# Patient Record
Sex: FEMALE | Race: NATIVE HAWAIIAN OR OTHER PACIFIC ISLANDER | Employment: FULL TIME | URBAN - METROPOLITAN AREA
[De-identification: names, ages, dates, MRNs, and addresses within clinical notes are randomized per-mention and may not be internally consistent; named-entity substitution may affect disease eponyms.]

---

## 2021-06-06 ENCOUNTER — HOSPITAL ENCOUNTER (EMERGENCY)
Age: 21
Discharge: HOME | End: 2021-06-06
Attending: EMERGENCY MEDICINE
Payer: COMMERCIAL

## 2021-06-06 ENCOUNTER — APPOINTMENT (OUTPATIENT)
Dept: GENERAL RADIOLOGY | Age: 21
End: 2021-06-06
Attending: EMERGENCY MEDICINE
Payer: COMMERCIAL

## 2021-06-06 VITALS
OXYGEN SATURATION: 99 % | WEIGHT: 160 LBS | HEART RATE: 82 BPM | RESPIRATION RATE: 16 BRPM | SYSTOLIC BLOOD PRESSURE: 101 MMHG | TEMPERATURE: 98 F | HEIGHT: 62 IN | DIASTOLIC BLOOD PRESSURE: 57 MMHG | BODY MASS INDEX: 29.44 KG/M2

## 2021-06-06 DIAGNOSIS — M24.411 RECURRENT ANTERIOR DISLOCATION OF RIGHT SHOULDER: Primary | ICD-10-CM

## 2021-06-06 PROCEDURE — 74011250636 HC RX REV CODE- 250/636: Performed by: EMERGENCY MEDICINE

## 2021-06-06 PROCEDURE — 73030 X-RAY EXAM OF SHOULDER: CPT

## 2021-06-06 PROCEDURE — 75810000301 HC ER LEVEL 1 CLOSED TREATMNT FRACTURE/DISLOCATION

## 2021-06-06 PROCEDURE — 99284 EMERGENCY DEPT VISIT MOD MDM: CPT

## 2021-06-06 RX ORDER — PROPOFOL 10 MG/ML
INJECTION, EMULSION INTRAVENOUS
Status: DISCONTINUED
Start: 2021-06-06 | End: 2021-06-06 | Stop reason: HOSPADM

## 2021-06-06 RX ORDER — PROPOFOL 10 MG/ML
200 INJECTION, EMULSION INTRAVENOUS
Status: COMPLETED | OUTPATIENT
Start: 2021-06-06 | End: 2021-06-06

## 2021-06-06 RX ADMIN — PROPOFOL 200 MG: 10 INJECTION, EMULSION INTRAVENOUS at 13:05

## 2021-06-06 RX ADMIN — SODIUM CHLORIDE 1000 ML: 900 INJECTION, SOLUTION INTRAVENOUS at 12:51

## 2021-06-06 NOTE — ED TRIAGE NOTES
Pt ambulatory to triage. Pt states she has a hx of right shoulder dislocation x 3 and today was throwing something and felt her shoulder pop and is unable to move right arm independently.

## 2021-06-06 NOTE — ED PROVIDER NOTES
[address] [facility] Beka Jaimes is a 23 y.o. female seen on 6/6/2021 in the [facility] EMERGENCY DEPT in room ER05/05. Chief Complaint   Patient presents with    Shoulder Pain     HPI: 22-year-old right-hand-dominant female presented to the emergency department with right shoulder pain and probable dislocation that occurred just prior to arrival.  Patient states that she was throwing something and felt her shoulder pop out of place. This is the third or fourth time she has had her dislocation. She does not have an established orthopedic doctor here in town. Patient has no other injuries. Patient cannot move her arm secondary to pain. She has no numbness or tingling. She has no other complaints at this time. Historian: Patient    REVIEW OF SYSTEMS     Review of Systems   Constitutional: Negative. Respiratory: Negative. Cardiovascular: Negative. Gastrointestinal: Negative. Genitourinary: Negative. Musculoskeletal: Positive for arthralgias. Skin: Negative. Neurological: Negative. Psychiatric/Behavioral: Negative. All other systems reviewed and are negative. PAST MEDICAL HISTORY     No past medical history on file. No past surgical history on file. Social History     Socioeconomic History    Marital status: SINGLE     Spouse name: Not on file    Number of children: Not on file    Years of education: Not on file    Highest education level: Not on file     Social Determinants of Health     Financial Resource Strain:     Difficulty of Paying Living Expenses:    Food Insecurity:     Worried About Running Out of Food in the Last Year:     [address] in the Last Year:    Transportation Needs:     Lack of Transportation (Medical):      Lack of Transportation (Non-Medical):    Physical Activity:     Days of Exercise per Week:     Minutes of Exercise per Session:    Stress:     Feeling of Stress :    Social Connections:     Frequency of Communication with Friends and Family:     Frequency of Social Gatherings with Friends and Family:     Attends [employer] Services:     Active Member of Clubs or Organizations:     Attends Club or Organization Meetings:     Marital Status:      None     No Known Allergies     PHYSICAL EXAM       Vitals:    06/06/21 1255 06/06/21 1302 06/06/21 1303 06/06/21 1335   BP: (!) 114/59 (!) 114/59 (!) 114/59 (!) 101/57   Pulse: 87 99 87 82   Resp: 16 16 16 16   Temp: 98 °F (36.7 °C)  98 °F (36.7 °C)    SpO2: 100% 99% 99% 99%    Vital signs were reviewed. Physical Exam  Vitals and nursing note reviewed. Constitutional:       General: She is in acute distress (Obvious discomfort). Appearance: Normal appearance. HENT:      Head: Normocephalic and atraumatic. Mouth/Throat:      Mouth: Mucous membranes are moist.   Eyes:      Extraocular Movements: Extraocular movements intact. Pulmonary:      Effort: Pulmonary effort is normal.   Musculoskeletal:         General: Tenderness and deformity present. Comments: Restricted range of motion secondary to pain of the right shoulder. Obvious deformity consistent with shoulder dislocation. Good sensation over the deltoid   Skin:     General: Skin is warm and dry. Neurological:      General: No focal deficit present. Mental Status: She is alert and oriented to person, place, and time. Psychiatric:         Mood and Affect: Mood normal.         Behavior: Behavior normal.         Thought Content: Thought content normal.         Judgment: Judgment normal.          MEDICAL DECISION MAKING     ED Course:    No results found for this or any previous visit (from the past 8 hour(s)). XR SHOULDER RT AP/LAT MIN 2 V    Result Date: 6/6/2021  Right shoulder Clinical indication: Follow-up of dislocation, acute moderate pain, reduction Comparison: Earlier today one hour prior Technique: 3 views Findings:  There is no evidence of fracture, dislocation, or erosion at this time. The alignment appears anatomic. Right upper lung remains clear. Reduction of the prior dislocation. XR SHOULDER RT AP/LAT MIN 2 V    Result Date: 6/6/2021  Right shoulder Clinical indication: Recurrent dislocation, patient felt a pop, acute moderate pain and limited range of motion now Comparison: none Technique: 3 views Findings: There is an anterior inferior dislocation of the humeral head with respect to the scapular glenoid. No obvious fracture or focal suspicious osseous lesion. Partially imaged right lung is clear. Glenohumeral dislocation.       MDM  Procedural Sedation    Date/Time: 6/6/2021 1:10 PM  Performed by: Brandi Haskins DO  Authorized by: Brandi Haskins DO     Consent:     Consent obtained:  Verbal and written    Consent given by:  Patient    Risks discussed:  Respiratory compromise necessitating ventilatory assistance and intubation, prolonged sedation necessitating reversal, prolonged hypoxia resulting in organ damage and inadequate sedation    Alternatives discussed:  Analgesia without sedation  Indications:     Procedure performed:  Dislocation reduction    Procedure necessitating sedation performed by:  Physician performing sedation    Intended level of sedation:  Moderate (conscious sedation)  Pre-sedation assessment:     NPO status caution: urgency dictates proceeding with non-ideal NPO status      ASA classification: class 1 - normal, healthy patient      Neck mobility: normal      Mouth opening:  3 or more finger widths    Pre-sedation assessments completed and reviewed: airway patency, anesthesia/sedation history, cardiovascular function, hydration status, mental status, nausea/vomiting, pain level, respiratory function and temperature      History of difficult intubation: no    Immediate pre-procedure details:     Reassessment: Patient reassessed immediately prior to procedure      Reviewed: vital signs      Verified: bag valve mask available, emergency equipment available, intubation equipment available, IV patency confirmed and oxygen available    Procedure details (see MAR for exact dosages):     Preoxygenation:  Nasal cannula    Sedation:  Propofol    Intra-procedure monitoring:  Blood pressure monitoring, cardiac monitor, continuous pulse oximetry, continuous capnometry, frequent LOC assessments and frequent vital sign checks    Intra-procedure events: none      Total sedation time (minutes):  15  Post-procedure details:     Post-sedation assessment completed:  6/6/2021 1:11 PM    Attendance: Constant attendance by certified staff until patient recovered      Recovery: Patient returned to pre-procedure baseline      Estimated blood loss (see I/O flowsheets): no      Complications:  None    Post-sedation assessments completed and reviewed: airway patency, cardiovascular function, hydration status, mental status, nausea/vomiting, pain level, respiratory function and temperature      Patient tolerance: Tolerated well, no immediate complications  DISLOCATION-UPPER EXT (ASAP ONLY)    Date/Time: 6/6/2021 1:12 PM  Performed by: Suzanne Patel DO  Authorized by: Suzanne Patel DO     Consent:     Consent obtained:  Written    Consent given by:  Patient    Risks discussed:  Fracture, nerve damage, irreducible dislocation and recurrent dislocation  Location:     Location:  Shoulder    Shoulder location:  R shoulder    Shoulder dislocation type: anterior      Chronicity:  Recurrent  Pre-procedure assessment:     Pre-procedure imaging:  X-ray    Imaging findings: dislocation present      Imaging findings: no fracture      Fracture of greater humeral tuberosity: no      Hill-Sachs deformity: no      Distal perfusion: normal    Sedation:     Sedation type:   Moderate (conscious) sedation  Procedure details:     Manipulation performed: yes      Shoulder reduction method:  External rotation    Reduction successful: yes      Reduction confirmed with imaging: yes Immobilization:  Sling  Post-procedure details:     Neurological function: normal      Distal perfusion: normal      Range of motion: improved      Patient tolerance of procedure: Tolerated well, no immediate complications        Disposition:  Discharged  Diagnosis:     ICD-10-CM ICD-9-CM   1. Recurrent anterior dislocation of right shoulder  M24.411 718.31     ____________________________________________________________________  A portion of this note was generated using voice recognition dictation software. While the note has been reviewed for accuracy, please note certain words and phrases may not be transcribed as intended and some grammatical and/or typographical errors may be present.

## 2021-06-06 NOTE — ED NOTES
I have reviewed discharge instructions with the patient. The patient verbalized understanding. Patient left ED via Discharge Method: ambulatory to Home with friend    Opportunity for questions and clarification provided. Patient given 0 scripts. Pt in no acute distress at time of d/c        To continue your aftercare when you leave the hospital, you may receive an automated call from our care team to check in on how you are doing. This is a free service and part of our promise to provide the best care and service to meet your aftercare needs.  If you have questions, or wish to unsubscribe from this service please call [phone]. Thank you for Choosing our [facility] Emergency Department.

## 2021-06-06 NOTE — DISCHARGE INSTRUCTIONS
Wear sling for the next 48 hours. Call to set up follow-up appointment with orthopedics. Take Tylenol/Motrin for pain.